# Patient Record
(demographics unavailable — no encounter records)

---

## 2025-04-28 NOTE — HISTORY OF PRESENT ILLNESS
[FreeTextEntry6] : Chief Complaint: Right ear pain and intermittent fever for one week.  History of Present Illness: The patient is a [child's age]-year-old child presenting with a one-week history of intermittent fever and right ear pain. This morning, the patient experienced increased ear pain, which improved with Motrin given at home. No reported ear drainage or hearing loss. Fever has been described as coming and going. No significant recent water exposure reported. No other systemic symptoms.

## 2025-04-28 NOTE — DISCUSSION/SUMMARY
[FreeTextEntry1] : Likely mild otitis externa of the right ear.  No evidence of acute otitis media.  Plan:  Prescribe topical antibiotic ear drops (e.g., ciprofloxacin-dexamethasone) to the affected ear.  Keep the ear dry; avoid water exposure.  Continue symptomatic treatment with ibuprofen or acetaminophen as needed for pain and fever.  Return to clinic if symptoms worsen (increased pain, fever, swelling, drainage) or do not improve within 48-72 hours.